# Patient Record
Sex: MALE | Race: WHITE | NOT HISPANIC OR LATINO | Employment: STUDENT | URBAN - METROPOLITAN AREA
[De-identification: names, ages, dates, MRNs, and addresses within clinical notes are randomized per-mention and may not be internally consistent; named-entity substitution may affect disease eponyms.]

---
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
## 2019-08-12 ENCOUNTER — OFFICE VISIT (OUTPATIENT)
Dept: RETAIL CLINIC | Facility: CLINIC | Age: 19
End: 2019-08-12

## 2019-08-12 VITALS
BODY MASS INDEX: 41.86 KG/M2 | WEIGHT: 299 LBS | DIASTOLIC BLOOD PRESSURE: 88 MMHG | HEIGHT: 71 IN | HEART RATE: 61 BPM | RESPIRATION RATE: 20 BRPM | OXYGEN SATURATION: 98 % | SYSTOLIC BLOOD PRESSURE: 140 MMHG

## 2019-08-12 DIAGNOSIS — Z02.5 SPORTS PHYSICAL: Primary | ICD-10-CM

## 2019-08-12 LAB
BILIRUB BLD-MCNC: NEGATIVE MG/DL
CLARITY, POC: CLEAR
COLOR UR: YELLOW
GLUCOSE UR STRIP-MCNC: NEGATIVE MG/DL
KETONES UR QL: NEGATIVE
LEUKOCYTE EST, POC: ABNORMAL
NITRITE UR-MCNC: NEGATIVE MG/ML
PH UR: 5 [PH] (ref 5–8)
PROT UR STRIP-MCNC: NEGATIVE MG/DL
RBC # UR STRIP: NEGATIVE /UL
SP GR UR: 1.01 (ref 1–1.03)
UROBILINOGEN UR QL: NORMAL

## 2019-08-12 PROCEDURE — 81003 URINALYSIS AUTO W/O SCOPE: CPT | Performed by: NURSE PRACTITIONER

## 2019-08-12 PROCEDURE — SPORTPHYS: Performed by: NURSE PRACTITIONER

## 2019-08-12 NOTE — PROGRESS NOTES
Exam completed. Form for [facility] sports physical includes urinalysis; completed today.  WNL except trace leukocytes. He is asymptomatic; encouraged him to increase water intake. His BMI is 41.7, which places him in the obese category. Discussed weight management and increased aerobic exercise. Carmine's BP is elevated and was noted at school physical last year as well. He is advised to monitor BP, keep a log of readings and take to next PCP appointment; encouraged to decrease sodium intake, aerobically exercise daily, drink plenty of clear decaffeinated fluids, practice stress relieving activities. He does not have a current PCP and states he did not follow up last year as advised. Gave list of local providers for general family practice and discussed that the expectation is that he establish care for further evaluation of his elevated BP.  He verbalized understanding.      Patient is cleared for participation in sports pending eval by PCP regarding elevated BP.  Anticipatory guidance given. Advised sports physicals do not replace the need for an annual well exam with PCP.  Patient retains physical exam form.  See scanned form for details.

## 2019-08-12 NOTE — PATIENT INSTRUCTIONS
Blood Pressure Record Sheet  To take your blood pressure, you will need a blood pressure machine. You can buy a blood pressure machine (blood pressure monitor) at your clinic, drug store, or online. When choosing one, consider:  · An automatic monitor that has an arm cuff.  · A cuff that wraps snugly around your upper arm. You should be able to fit only one finger between your arm and the cuff.  · A device that stores blood pressure reading results.  · Do not choose a monitor that measures your blood pressure from your wrist or finger.  Follow your health care provider's instructions for how to take your blood pressure. To use this form:  · Get one reading in the morning (a.m.) before you take any medicines.  · Get one reading in the evening (p.m.) before supper.  · Take at least 2 readings with each blood pressure check. This makes sure the results are correct. Wait 1-2 minutes between measurements.  · Write down the results in the spaces on this form.  · Repeat this once a week, or as told by your health care provider.  · Make a follow-up appointment with your health care provider to discuss the results.  Blood pressure log  Date: _______________________  · a.m. _____________________(1st reading) _____________________(2nd reading)  · p.m. _____________________(1st reading) _____________________(2nd reading)  Date: _______________________  · a.m. _____________________(1st reading) _____________________(2nd reading)  · p.m. _____________________(1st reading) _____________________(2nd reading)  Date: _______________________  · a.m. _____________________(1st reading) _____________________(2nd reading)  · p.m. _____________________(1st reading) _____________________(2nd reading)  Date: _______________________  · a.m. _____________________(1st reading) _____________________(2nd reading)  · p.m. _____________________(1st reading) _____________________(2nd reading)  Date: _______________________  · a.m.  _____________________(1st reading) _____________________(2nd reading)  · p.m. _____________________(1st reading) _____________________(2nd reading)  This information is not intended to replace advice given to you by your health care provider. Make sure you discuss any questions you have with your health care provider.  Document Released: 09/15/2004 Document Revised: 12/18/2018 Document Reviewed: 12/18/2018  [employer] Interactive Patient Education © 2019 [employer]